# Patient Record
Sex: MALE | Race: BLACK OR AFRICAN AMERICAN | URBAN - METROPOLITAN AREA
[De-identification: names, ages, dates, MRNs, and addresses within clinical notes are randomized per-mention and may not be internally consistent; named-entity substitution may affect disease eponyms.]

---

## 2023-10-14 ENCOUNTER — APPOINTMENT (OUTPATIENT)
Dept: GENERAL RADIOLOGY | Age: 73
End: 2023-10-14
Payer: MEDICARE

## 2023-10-14 ENCOUNTER — HOSPITAL ENCOUNTER (EMERGENCY)
Age: 73
Discharge: HOME | End: 2023-10-14
Attending: EMERGENCY MEDICINE
Payer: MEDICARE

## 2023-10-14 VITALS
WEIGHT: 204.2 LBS | BODY MASS INDEX: 29.23 KG/M2 | RESPIRATION RATE: 18 BRPM | OXYGEN SATURATION: 97 % | HEART RATE: 88 BPM | DIASTOLIC BLOOD PRESSURE: 99 MMHG | HEIGHT: 70 IN | SYSTOLIC BLOOD PRESSURE: 128 MMHG | TEMPERATURE: 98.7 F

## 2023-10-14 DIAGNOSIS — T14.8XXA BITE BY ANIMAL: ICD-10-CM

## 2023-10-14 DIAGNOSIS — S62.396A CLOSED DISPLACED FRACTURE OF OTHER PART OF FIFTH METACARPAL BONE OF RIGHT HAND, INITIAL ENCOUNTER: Primary | ICD-10-CM

## 2023-10-14 PROCEDURE — 12002 RPR S/N/AX/GEN/TRNK2.6-7.5CM: CPT

## 2023-10-14 PROCEDURE — 2500000003 HC RX 250 WO HCPCS

## 2023-10-14 PROCEDURE — 6370000000 HC RX 637 (ALT 250 FOR IP): Performed by: EMERGENCY MEDICINE

## 2023-10-14 PROCEDURE — 73130 X-RAY EXAM OF HAND: CPT

## 2023-10-14 PROCEDURE — 6360000002 HC RX W HCPCS

## 2023-10-14 PROCEDURE — 6370000000 HC RX 637 (ALT 250 FOR IP)

## 2023-10-14 PROCEDURE — 99284 EMERGENCY DEPT VISIT MOD MDM: CPT

## 2023-10-14 PROCEDURE — 90471 IMMUNIZATION ADMIN: CPT

## 2023-10-14 PROCEDURE — 90715 TDAP VACCINE 7 YRS/> IM: CPT

## 2023-10-14 RX ORDER — BACITRACIN ZINC AND POLYMYXIN B SULFATE 500; 1000 [USP'U]/G; [USP'U]/G
OINTMENT TOPICAL ONCE
Status: COMPLETED | OUTPATIENT
Start: 2023-10-14 | End: 2023-10-14

## 2023-10-14 RX ORDER — IBUPROFEN 600 MG/1
600 TABLET ORAL 3 TIMES DAILY PRN
Qty: 30 TABLET | Refills: 0 | Status: SHIPPED | OUTPATIENT
Start: 2023-10-14

## 2023-10-14 RX ORDER — OXYCODONE HYDROCHLORIDE AND ACETAMINOPHEN 5; 325 MG/1; MG/1
1 TABLET ORAL ONCE
Status: COMPLETED | OUTPATIENT
Start: 2023-10-14 | End: 2023-10-14

## 2023-10-14 RX ORDER — AMOXICILLIN AND CLAVULANATE POTASSIUM 875; 125 MG/1; MG/1
1 TABLET, FILM COATED ORAL 2 TIMES DAILY
Qty: 14 TABLET | Refills: 0 | Status: SHIPPED | OUTPATIENT
Start: 2023-10-14 | End: 2023-10-21

## 2023-10-14 RX ORDER — OXYCODONE HYDROCHLORIDE AND ACETAMINOPHEN 5; 325 MG/1; MG/1
1 TABLET ORAL EVERY 6 HOURS PRN
Qty: 12 TABLET | Refills: 0 | Status: SHIPPED | OUTPATIENT
Start: 2023-10-14 | End: 2023-10-17

## 2023-10-14 RX ORDER — AMOXICILLIN AND CLAVULANATE POTASSIUM 875; 125 MG/1; MG/1
1 TABLET, FILM COATED ORAL ONCE
Status: COMPLETED | OUTPATIENT
Start: 2023-10-14 | End: 2023-10-14

## 2023-10-14 RX ADMIN — AMOXICILLIN AND CLAVULANATE POTASSIUM 1 TABLET: 875; 125 TABLET, FILM COATED ORAL at 15:51

## 2023-10-14 RX ADMIN — LIDOCAINE HYDROCHLORIDE 20 ML: 10; .005 INJECTION, SOLUTION EPIDURAL; INFILTRATION; INTRACAUDAL; PERINEURAL at 15:51

## 2023-10-14 RX ADMIN — Medication 1 G: at 18:19

## 2023-10-14 RX ADMIN — OXYCODONE AND ACETAMINOPHEN 1 TABLET: 5; 325 TABLET ORAL at 17:12

## 2023-10-14 RX ADMIN — TETANUS TOXOID, REDUCED DIPHTHERIA TOXOID AND ACELLULAR PERTUSSIS VACCINE, ADSORBED 0.5 ML: 5; 2.5; 8; 8; 2.5 SUSPENSION INTRAMUSCULAR at 16:24

## 2023-10-14 ASSESSMENT — PAIN DESCRIPTION - ONSET: ONSET: SUDDEN

## 2023-10-14 ASSESSMENT — PAIN - FUNCTIONAL ASSESSMENT
PAIN_FUNCTIONAL_ASSESSMENT: NONE - DENIES PAIN
PAIN_FUNCTIONAL_ASSESSMENT: 0-10

## 2023-10-14 ASSESSMENT — PAIN SCALES - GENERAL: PAINLEVEL_OUTOF10: 5

## 2023-10-14 ASSESSMENT — PAIN DESCRIPTION - DESCRIPTORS: DESCRIPTORS: DISCOMFORT;SORE

## 2023-10-14 ASSESSMENT — PAIN DESCRIPTION - FREQUENCY: FREQUENCY: CONTINUOUS

## 2023-10-14 ASSESSMENT — PAIN DESCRIPTION - PAIN TYPE: TYPE: ACUTE PAIN

## 2023-10-14 ASSESSMENT — PAIN DESCRIPTION - LOCATION: LOCATION: HAND

## 2023-10-14 ASSESSMENT — PAIN DESCRIPTION - ORIENTATION: ORIENTATION: RIGHT

## 2023-10-14 NOTE — ED PROVIDER NOTES
ED Attending Attestation Note     Date of evaluation: 10/14/2023    This patient was seen by the resident. I have seen and examined the patient, agree with the workup, evaluation, management and diagnosis. The care plan has been discussed. My assessment reveals a 72-year-old male who presents with a chief complaint of animal bite. Was bitten by one of his dogs last night. Came this morning because he still had some blood. Has several small bite marks and one small laceration to the dorsal aspect as well as the palmar aspect of his hand. Largest laceration is to the dorsal aspect of his hand, approximately 2 cm. Well approximated. Good pulses. Significant swelling to the dorsal aspect of the hand, but good range of motion of all of the fingers and wrist.    I was present for key portions of laceration repair performed by resident. Lelo Jain MD  10/14/23 [id number]

## 2023-10-14 NOTE — DISCHARGE INSTRUCTIONS
You need to follow-up with orthopedic surgery both for your fracture as well as for the bite. If you feel like your hand is getting infected with worsening pain, foul smells, or you are having fevers greater than 101, return here immediately for reevaluation. You need to take antibiotics to help prevent infection. You also need to follow-up to get your sutures removed in 7 to 10 days. Take ibuprofen or Tylenol for pain.

## 2024-05-13 ENCOUNTER — APPOINTMENT (OUTPATIENT)
Dept: GENERAL RADIOLOGY | Age: 74
End: 2024-05-13
Payer: COMMERCIAL

## 2024-05-13 ENCOUNTER — HOSPITAL ENCOUNTER (EMERGENCY)
Age: 74
Discharge: HOME | End: 2024-05-13
Attending: EMERGENCY MEDICINE
Payer: COMMERCIAL

## 2024-05-13 VITALS
SYSTOLIC BLOOD PRESSURE: 145 MMHG | WEIGHT: 209.4 LBS | BODY MASS INDEX: 31.01 KG/M2 | HEIGHT: 69 IN | OXYGEN SATURATION: 98 % | DIASTOLIC BLOOD PRESSURE: 114 MMHG | RESPIRATION RATE: 18 BRPM | HEART RATE: 94 BPM | TEMPERATURE: 97.9 F
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
## 2024-05-13 PROCEDURE — 99283 EMERGENCY DEPT VISIT LOW MDM: CPT

## 2024-05-13 PROCEDURE — 6370000000 HC RX 637 (ALT 250 FOR IP)

## 2024-05-13 PROCEDURE — 73630 X-RAY EXAM OF FOOT: CPT

## 2024-05-13 RX ORDER — AMOXICILLIN AND CLAVULANATE POTASSIUM 875; 125 MG/1; MG/1
1 TABLET, FILM COATED ORAL ONCE
Status: COMPLETED | OUTPATIENT
Start: 2024-05-13 | End: 2024-05-13

## 2024-05-13 RX ORDER — AMOXICILLIN AND CLAVULANATE POTASSIUM 875; 125 MG/1; MG/1
1 TABLET, FILM COATED ORAL 2 TIMES DAILY
Qty: 19 TABLET | Refills: 0 | Status: SHIPPED | OUTPATIENT
Start: 2024-05-13 | End: 2024-05-23

## 2024-05-13 RX ADMIN — AMOXICILLIN AND CLAVULANATE POTASSIUM 1 TABLET: 875; 125 TABLET, FILM COATED ORAL at 16:36

## 2024-05-13 ASSESSMENT — LIFESTYLE VARIABLES
HOW OFTEN DO YOU HAVE A DRINK CONTAINING ALCOHOL: MONTHLY OR LESS
HOW MANY STANDARD DRINKS CONTAINING ALCOHOL DO YOU HAVE ON A TYPICAL DAY: 1 OR 2

## 2024-05-13 ASSESSMENT — PAIN - FUNCTIONAL ASSESSMENT: PAIN_FUNCTIONAL_ASSESSMENT: 0-10

## 2024-05-13 ASSESSMENT — PAIN SCALES - GENERAL: PAINLEVEL_OUTOF10: 3

## 2024-05-13 ASSESSMENT — PAIN DESCRIPTION - ORIENTATION: ORIENTATION: LEFT

## 2024-05-13 ASSESSMENT — PAIN DESCRIPTION - LOCATION: LOCATION: FOOT

## 2024-05-13 ASSESSMENT — PAIN DESCRIPTION - DESCRIPTORS: DESCRIPTORS: SORE

## 2024-05-13 ASSESSMENT — PAIN DESCRIPTION - PAIN TYPE: TYPE: ACUTE PAIN

## 2024-05-13 NOTE — ED PROVIDER NOTES
ED Attending Attestation Note     Date of evaluation: 5/13/2024    This patient was seen by the resident.  I have seen and examined the patient, agree with the workup, evaluation, management and diagnosis. The care plan has been discussed.      Briefly, Bijan Nelson is a 73 y.o. male with a PMH inclusive of sciatica who presents for evaluation of dog bite to left foot that occurred 3 days ago.  Had some swelling to his left foot but called his doctor for a visit and was told he should come to the ED because he may need a tetanus shot.    Notable exam findings include wound to left foot, no active drainage.  Some surrounding erythema.  Edema of left foot and ankle.  Preserved active range of motion of the foot and ankle    Assessment/ Medical Decision Making:     Patient with evidence of secondary infection, however I am reassured by his preserved active range of motion, absence of pain at rest, and preserved ability to ambulate.  With this, if x-ray reassuring, do think he is appropriate for trial of oral antibiotics and outpatient follow-up with strict return precautions.  He is established with a primary care doctor and states that they will be able to see him in the next 1 to 2 days for recheck to make sure he is healing okay.    Clinical impression: dog bite     Elo Nichols MD  05/13/24 1649       Elo Nichols MD  05/16/24 0011

## 2024-05-13 NOTE — ED NOTES
infections such as osteomyelitis. At this time we will proceed with augmentin and xray imaging of the left foot which reveals soft tissue infection without osteomyelitis evidence at this time. Podiatry consultation held off due to reassuring symptoms and patient ultimately deemed appropriate for discharge with abx and outpatient close follow-up with his PCP.     Is this patient to be included in the SEP-1 core measure? No Exclusion criteria - the patient is NOT to be included for SEP-1 Core Measure due to: 2+ SIRS criteria are not met    Medical Decision Making  Amount and/or Complexity of Data Reviewed  Radiology: ordered.    Risk  Prescription drug management.        This patient was also evaluated by the attending physician. All care plans were discussed and agreed upon.    Clinical Impression     No diagnosis found.    Disposition     PATIENT REFERRED TO:  No follow-up provider specified.    DISCHARGE MEDICATIONS:  New Prescriptions    No medications on file       DISPOSITION      -home w/outpatient f/u    Diagnostic Results and Other Data     RADIOLOGY:  XR FOOT LEFT (MIN 3 VIEWS)    (Results Pending)       LABS:   No results found for this visit on 05/13/24.      RECENT VITALS:  BP: (!) 145/114, Temp: 97.9 °F (36.6 °C), Pulse: 94,Respirations: 18, SpO2: 98 %     Procedures   N/a    ED Course     Nursing Notes, Past Medical Hx, Past Surgical Hx, Social Hx, Allergies, and Family Hx were reviewed.         The patient was given the following medications:  No orders of the defined types were placed in this encounter.      CONSULTS:  None    Review of Systems     Review of Systems  Complete ROS conducted with positive findings outlined in HPI    Past Medical, Surgical, Family, and Social History     He has a past medical history of Sciatica.  He has no past surgical history on file.  His family history is not on file.  He reports that he has been smoking cigarettes. He does not have any smokeless tobacco history on

## 2024-05-13 NOTE — DISCHARGE INSTRUCTIONS
You have been prescribed an antibiotic called Augmentin. Please take one tablet tonight and then twice a day for a total of 10 days. Please complete this course of antibiotics for your wound infection.  Please continue dressing your wound with a protective barrier.  Please follow-up with your primary care doctor in 1-2 days for close follow-up of your infection.